# Patient Record
Sex: MALE | Race: WHITE | NOT HISPANIC OR LATINO | Employment: FULL TIME | URBAN - METROPOLITAN AREA
[De-identification: names, ages, dates, MRNs, and addresses within clinical notes are randomized per-mention and may not be internally consistent; named-entity substitution may affect disease eponyms.]

---

## 2022-06-28 ENCOUNTER — PREP FOR PROCEDURE (OUTPATIENT)
Dept: OTHER | Facility: CLINIC | Age: 42
End: 2022-06-28

## 2022-06-28 ENCOUNTER — ANESTHESIA (OUTPATIENT)
Dept: SURGERY | Facility: CLINIC | Age: 42
End: 2022-06-28
Payer: COMMERCIAL

## 2022-06-28 ENCOUNTER — APPOINTMENT (OUTPATIENT)
Dept: MRI IMAGING | Facility: CLINIC | Age: 42
End: 2022-06-28
Attending: EMERGENCY MEDICINE
Payer: COMMERCIAL

## 2022-06-28 ENCOUNTER — HOSPITAL ENCOUNTER (OUTPATIENT)
Facility: CLINIC | Age: 42
Setting detail: OBSERVATION
Discharge: HOME | End: 2022-06-29
Attending: EMERGENCY MEDICINE | Admitting: FAMILY MEDICINE
Payer: COMMERCIAL

## 2022-06-28 ENCOUNTER — ANESTHESIA EVENT (OUTPATIENT)
Dept: SURGERY | Facility: CLINIC | Age: 42
End: 2022-06-28
Payer: COMMERCIAL

## 2022-06-28 DIAGNOSIS — L02.611 ABSCESS OF GREAT TOE OF RIGHT FOOT: Primary | ICD-10-CM

## 2022-06-28 DIAGNOSIS — L08.9 TOE INFECTION: ICD-10-CM

## 2022-06-28 DIAGNOSIS — M00.9 SEPTIC ARTHRITIS, DUE TO UNSPECIFIED ORGANISM, SEPTIC ARTHRITIS OF UNSPECIFIED LOCATION (H): ICD-10-CM

## 2022-06-28 DIAGNOSIS — M00.9: Primary | ICD-10-CM

## 2022-06-28 PROBLEM — I10 ESSENTIAL HYPERTENSION: Status: ACTIVE | Noted: 2022-04-05

## 2022-06-28 PROBLEM — M54.2 NECK PAIN: Status: ACTIVE | Noted: 2022-01-20

## 2022-06-28 PROBLEM — F41.9 ANXIETY: Status: ACTIVE | Noted: 2022-01-20

## 2022-06-28 LAB
ABO/RH(D): NORMAL
ANION GAP SERPL CALCULATED.3IONS-SCNC: 10 MMOL/L (ref 5–18)
ANTIBODY SCREEN: NEGATIVE
APTT PPP: 28 SECONDS (ref 22–38)
BASOPHILS # BLD AUTO: 0.1 10E3/UL (ref 0–0.2)
BASOPHILS NFR BLD AUTO: 1 %
BUN SERPL-MCNC: 11 MG/DL (ref 8–22)
C REACTIVE PROTEIN LHE: 2.9 MG/DL (ref 0–?)
CALCIUM SERPL-MCNC: 9.5 MG/DL (ref 8.5–10.5)
CHLORIDE BLD-SCNC: 102 MMOL/L (ref 98–107)
CO2 SERPL-SCNC: 26 MMOL/L (ref 22–31)
CREAT SERPL-MCNC: 0.78 MG/DL (ref 0.7–1.3)
EOSINOPHIL # BLD AUTO: 0 10E3/UL (ref 0–0.7)
EOSINOPHIL NFR BLD AUTO: 0 %
ERYTHROCYTE [DISTWIDTH] IN BLOOD BY AUTOMATED COUNT: 12.1 % (ref 10–15)
ERYTHROCYTE [SEDIMENTATION RATE] IN BLOOD BY WESTERGREN METHOD: 8 MM/HR (ref 0–15)
GFR SERPL CREATININE-BSD FRML MDRD: >90 ML/MIN/1.73M2
GLUCOSE BLD-MCNC: 107 MG/DL (ref 70–125)
GRAM STAIN RESULT: NORMAL
GRAM STAIN RESULT: NORMAL
HCT VFR BLD AUTO: 44.7 % (ref 40–53)
HGB BLD-MCNC: 15.5 G/DL (ref 13.3–17.7)
IMM GRANULOCYTES # BLD: 0.1 10E3/UL
IMM GRANULOCYTES NFR BLD: 1 %
INR PPP: 0.99 (ref 0.85–1.15)
LYMPHOCYTES # BLD AUTO: 1.3 10E3/UL (ref 0.8–5.3)
LYMPHOCYTES NFR BLD AUTO: 15 %
MCH RBC QN AUTO: 31.8 PG (ref 26.5–33)
MCHC RBC AUTO-ENTMCNC: 34.7 G/DL (ref 31.5–36.5)
MCV RBC AUTO: 92 FL (ref 78–100)
MONOCYTES # BLD AUTO: 0.7 10E3/UL (ref 0–1.3)
MONOCYTES NFR BLD AUTO: 7 %
NEUTROPHILS # BLD AUTO: 6.7 10E3/UL (ref 1.6–8.3)
NEUTROPHILS NFR BLD AUTO: 76 %
NRBC # BLD AUTO: 0 10E3/UL
NRBC BLD AUTO-RTO: 0 /100
PLATELET # BLD AUTO: 231 10E3/UL (ref 150–450)
POTASSIUM BLD-SCNC: 4.1 MMOL/L (ref 3.5–5)
RBC # BLD AUTO: 4.87 10E6/UL (ref 4.4–5.9)
SARS-COV-2 RNA RESP QL NAA+PROBE: NEGATIVE
SODIUM SERPL-SCNC: 138 MMOL/L (ref 136–145)
SPECIMEN EXPIRATION DATE: NORMAL
WBC # BLD AUTO: 8.8 10E3/UL (ref 4–11)

## 2022-06-28 PROCEDURE — 258N000003 HC RX IP 258 OP 636: Performed by: ANESTHESIOLOGY

## 2022-06-28 PROCEDURE — 258N000001 HC RX 258: Performed by: STUDENT IN AN ORGANIZED HEALTH CARE EDUCATION/TRAINING PROGRAM

## 2022-06-28 PROCEDURE — U0003 INFECTIOUS AGENT DETECTION BY NUCLEIC ACID (DNA OR RNA); SEVERE ACUTE RESPIRATORY SYNDROME CORONAVIRUS 2 (SARS-COV-2) (CORONAVIRUS DISEASE [COVID-19]), AMPLIFIED PROBE TECHNIQUE, MAKING USE OF HIGH THROUGHPUT TECHNOLOGIES AS DESCRIBED BY CMS-2020-01-R: HCPCS | Performed by: EMERGENCY MEDICINE

## 2022-06-28 PROCEDURE — 250N000011 HC RX IP 250 OP 636

## 2022-06-28 PROCEDURE — 80048 BASIC METABOLIC PNL TOTAL CA: CPT | Performed by: EMERGENCY MEDICINE

## 2022-06-28 PROCEDURE — 250N000011 HC RX IP 250 OP 636: Performed by: PHYSICIAN ASSISTANT

## 2022-06-28 PROCEDURE — G0378 HOSPITAL OBSERVATION PER HR: HCPCS

## 2022-06-28 PROCEDURE — 370N000017 HC ANESTHESIA TECHNICAL FEE, PER MIN: Performed by: STUDENT IN AN ORGANIZED HEALTH CARE EDUCATION/TRAINING PROGRAM

## 2022-06-28 PROCEDURE — 258N000003 HC RX IP 258 OP 636

## 2022-06-28 PROCEDURE — 99220 PR INITIAL OBSERVATION CARE,LEVEL III: CPT | Mod: GC

## 2022-06-28 PROCEDURE — 250N000013 HC RX MED GY IP 250 OP 250 PS 637: Performed by: PHYSICIAN ASSISTANT

## 2022-06-28 PROCEDURE — 250N000011 HC RX IP 250 OP 636: Performed by: NURSE ANESTHETIST, CERTIFIED REGISTERED

## 2022-06-28 PROCEDURE — 86901 BLOOD TYPING SEROLOGIC RH(D): CPT | Performed by: EMERGENCY MEDICINE

## 2022-06-28 PROCEDURE — 272N000001 HC OR GENERAL SUPPLY STERILE: Performed by: STUDENT IN AN ORGANIZED HEALTH CARE EDUCATION/TRAINING PROGRAM

## 2022-06-28 PROCEDURE — A9585 GADOBUTROL INJECTION: HCPCS | Performed by: EMERGENCY MEDICINE

## 2022-06-28 PROCEDURE — 93005 ELECTROCARDIOGRAM TRACING: CPT

## 2022-06-28 PROCEDURE — 86140 C-REACTIVE PROTEIN: CPT | Performed by: EMERGENCY MEDICINE

## 2022-06-28 PROCEDURE — 36415 COLL VENOUS BLD VENIPUNCTURE: CPT | Performed by: EMERGENCY MEDICINE

## 2022-06-28 PROCEDURE — 85004 AUTOMATED DIFF WBC COUNT: CPT | Performed by: EMERGENCY MEDICINE

## 2022-06-28 PROCEDURE — 87641 MR-STAPH DNA AMP PROBE: CPT

## 2022-06-28 PROCEDURE — 250N000009 HC RX 250: Performed by: STUDENT IN AN ORGANIZED HEALTH CARE EDUCATION/TRAINING PROGRAM

## 2022-06-28 PROCEDURE — 87205 SMEAR GRAM STAIN: CPT | Performed by: STUDENT IN AN ORGANIZED HEALTH CARE EDUCATION/TRAINING PROGRAM

## 2022-06-28 PROCEDURE — 85652 RBC SED RATE AUTOMATED: CPT | Performed by: EMERGENCY MEDICINE

## 2022-06-28 PROCEDURE — 999N000141 HC STATISTIC PRE-PROCEDURE NURSING ASSESSMENT: Performed by: STUDENT IN AN ORGANIZED HEALTH CARE EDUCATION/TRAINING PROGRAM

## 2022-06-28 PROCEDURE — 96375 TX/PRO/DX INJ NEW DRUG ADDON: CPT

## 2022-06-28 PROCEDURE — 99285 EMERGENCY DEPT VISIT HI MDM: CPT | Mod: 25

## 2022-06-28 PROCEDURE — 87070 CULTURE OTHR SPECIMN AEROBIC: CPT | Performed by: STUDENT IN AN ORGANIZED HEALTH CARE EDUCATION/TRAINING PROGRAM

## 2022-06-28 PROCEDURE — 96374 THER/PROPH/DIAG INJ IV PUSH: CPT

## 2022-06-28 PROCEDURE — 255N000002 HC RX 255 OP 636: Performed by: EMERGENCY MEDICINE

## 2022-06-28 PROCEDURE — 87077 CULTURE AEROBIC IDENTIFY: CPT | Performed by: STUDENT IN AN ORGANIZED HEALTH CARE EDUCATION/TRAINING PROGRAM

## 2022-06-28 PROCEDURE — 73720 MRI LWR EXTREMITY W/O&W/DYE: CPT | Mod: RT

## 2022-06-28 PROCEDURE — 85610 PROTHROMBIN TIME: CPT | Performed by: EMERGENCY MEDICINE

## 2022-06-28 PROCEDURE — 85730 THROMBOPLASTIN TIME PARTIAL: CPT | Performed by: EMERGENCY MEDICINE

## 2022-06-28 PROCEDURE — 86850 RBC ANTIBODY SCREEN: CPT | Performed by: EMERGENCY MEDICINE

## 2022-06-28 PROCEDURE — C9803 HOPD COVID-19 SPEC COLLECT: HCPCS

## 2022-06-28 PROCEDURE — 360N000075 HC SURGERY LEVEL 2, PER MIN: Performed by: STUDENT IN AN ORGANIZED HEALTH CARE EDUCATION/TRAINING PROGRAM

## 2022-06-28 RX ORDER — FENTANYL CITRATE 50 UG/ML
INJECTION, SOLUTION INTRAMUSCULAR; INTRAVENOUS PRN
Status: DISCONTINUED | OUTPATIENT
Start: 2022-06-28 | End: 2022-06-28

## 2022-06-28 RX ORDER — ACETAMINOPHEN 325 MG/1
975 TABLET ORAL EVERY 8 HOURS
Status: DISCONTINUED | OUTPATIENT
Start: 2022-06-28 | End: 2022-06-29 | Stop reason: HOSPADM

## 2022-06-28 RX ORDER — ACETAMINOPHEN 325 MG/1
650 TABLET ORAL EVERY 6 HOURS PRN
Status: DISCONTINUED | OUTPATIENT
Start: 2022-06-28 | End: 2022-06-28

## 2022-06-28 RX ORDER — NAPROXEN 250 MG/1
500 TABLET ORAL EVERY 12 HOURS PRN
Status: DISCONTINUED | OUTPATIENT
Start: 2022-06-28 | End: 2022-06-28

## 2022-06-28 RX ORDER — MAGNESIUM HYDROXIDE 1200 MG/15ML
LIQUID ORAL PRN
Status: DISCONTINUED | OUTPATIENT
Start: 2022-06-28 | End: 2022-06-28 | Stop reason: HOSPADM

## 2022-06-28 RX ORDER — BISACODYL 10 MG
10 SUPPOSITORY, RECTAL RECTAL DAILY PRN
Status: DISCONTINUED | OUTPATIENT
Start: 2022-06-28 | End: 2022-06-29 | Stop reason: HOSPADM

## 2022-06-28 RX ORDER — ENOXAPARIN SODIUM 100 MG/ML
40 INJECTION SUBCUTANEOUS EVERY 24 HOURS
Status: DISCONTINUED | OUTPATIENT
Start: 2022-06-28 | End: 2022-06-28

## 2022-06-28 RX ORDER — GADOBUTROL 604.72 MG/ML
8 INJECTION INTRAVENOUS ONCE
Status: COMPLETED | OUTPATIENT
Start: 2022-06-28 | End: 2022-06-28

## 2022-06-28 RX ORDER — ONDANSETRON 2 MG/ML
INJECTION INTRAMUSCULAR; INTRAVENOUS PRN
Status: DISCONTINUED | OUTPATIENT
Start: 2022-06-28 | End: 2022-06-28

## 2022-06-28 RX ORDER — LIDOCAINE 40 MG/G
CREAM TOPICAL
Status: DISCONTINUED | OUTPATIENT
Start: 2022-06-28 | End: 2022-06-29 | Stop reason: HOSPADM

## 2022-06-28 RX ORDER — SODIUM CHLORIDE 9 MG/ML
INJECTION, SOLUTION INTRAVENOUS CONTINUOUS
Status: DISCONTINUED | OUTPATIENT
Start: 2022-06-28 | End: 2022-06-29 | Stop reason: HOSPADM

## 2022-06-28 RX ORDER — NALOXONE HYDROCHLORIDE 0.4 MG/ML
0.2 INJECTION, SOLUTION INTRAMUSCULAR; INTRAVENOUS; SUBCUTANEOUS
Status: DISCONTINUED | OUTPATIENT
Start: 2022-06-28 | End: 2022-06-29 | Stop reason: HOSPADM

## 2022-06-28 RX ORDER — CEFTRIAXONE 1 G/1
1 INJECTION, POWDER, FOR SOLUTION INTRAMUSCULAR; INTRAVENOUS ONCE
Status: COMPLETED | OUTPATIENT
Start: 2022-06-28 | End: 2022-06-28

## 2022-06-28 RX ORDER — HYDROMORPHONE HYDROCHLORIDE 1 MG/ML
0.5 INJECTION, SOLUTION INTRAMUSCULAR; INTRAVENOUS; SUBCUTANEOUS EVERY 4 HOURS PRN
Status: DISCONTINUED | OUTPATIENT
Start: 2022-06-28 | End: 2022-06-28

## 2022-06-28 RX ORDER — FENTANYL CITRATE 50 UG/ML
25 INJECTION, SOLUTION INTRAMUSCULAR; INTRAVENOUS EVERY 5 MIN PRN
Status: CANCELLED | OUTPATIENT
Start: 2022-06-28

## 2022-06-28 RX ORDER — LISINOPRIL 20 MG/1
20 TABLET ORAL EVERY EVENING
COMMUNITY
Start: 2022-05-05 | End: 2023-05-05

## 2022-06-28 RX ORDER — CEFAZOLIN SODIUM/WATER 2 G/20 ML
2 SYRINGE (ML) INTRAVENOUS
Status: DISCONTINUED | OUTPATIENT
Start: 2022-06-28 | End: 2022-06-28 | Stop reason: ALTCHOICE

## 2022-06-28 RX ORDER — PROPOFOL 10 MG/ML
INJECTION, EMULSION INTRAVENOUS CONTINUOUS PRN
Status: DISCONTINUED | OUTPATIENT
Start: 2022-06-28 | End: 2022-06-28

## 2022-06-28 RX ORDER — ASPIRIN 81 MG/1
81 TABLET ORAL 2 TIMES DAILY
Status: DISCONTINUED | OUTPATIENT
Start: 2022-06-28 | End: 2022-06-29 | Stop reason: HOSPADM

## 2022-06-28 RX ORDER — LIDOCAINE 40 MG/G
CREAM TOPICAL
Status: DISCONTINUED | OUTPATIENT
Start: 2022-06-28 | End: 2022-06-28 | Stop reason: HOSPADM

## 2022-06-28 RX ORDER — ENOXAPARIN SODIUM 100 MG/ML
40 INJECTION SUBCUTANEOUS EVERY 24 HOURS
Status: DISCONTINUED | OUTPATIENT
Start: 2022-06-29 | End: 2022-06-29 | Stop reason: HOSPADM

## 2022-06-28 RX ORDER — CEFTRIAXONE 1 G/1
1 INJECTION, POWDER, FOR SOLUTION INTRAMUSCULAR; INTRAVENOUS EVERY 24 HOURS
Status: DISCONTINUED | OUTPATIENT
Start: 2022-06-29 | End: 2022-06-29 | Stop reason: HOSPADM

## 2022-06-28 RX ORDER — HYDROMORPHONE HCL IN WATER/PF 6 MG/30 ML
0.4 PATIENT CONTROLLED ANALGESIA SYRINGE INTRAVENOUS
Status: DISCONTINUED | OUTPATIENT
Start: 2022-06-28 | End: 2022-06-29 | Stop reason: HOSPADM

## 2022-06-28 RX ORDER — CEFAZOLIN SODIUM/WATER 2 G/20 ML
2 SYRINGE (ML) INTRAVENOUS SEE ADMIN INSTRUCTIONS
Status: DISCONTINUED | OUTPATIENT
Start: 2022-06-28 | End: 2022-06-28

## 2022-06-28 RX ORDER — CEFTRIAXONE SODIUM 1 G
1 VIAL (EA) INJECTION EVERY 24 HOURS
Status: DISCONTINUED | OUTPATIENT
Start: 2022-06-29 | End: 2022-06-28 | Stop reason: CLARIF

## 2022-06-28 RX ORDER — ACETAMINOPHEN 325 MG/1
650 TABLET ORAL EVERY 4 HOURS PRN
Status: DISCONTINUED | OUTPATIENT
Start: 2022-07-01 | End: 2022-06-29 | Stop reason: HOSPADM

## 2022-06-28 RX ORDER — DEXAMETHASONE SODIUM PHOSPHATE 4 MG/ML
INJECTION, SOLUTION INTRA-ARTICULAR; INTRALESIONAL; INTRAMUSCULAR; INTRAVENOUS; SOFT TISSUE PRN
Status: DISCONTINUED | OUTPATIENT
Start: 2022-06-28 | End: 2022-06-28

## 2022-06-28 RX ORDER — PROPOFOL 10 MG/ML
INJECTION, EMULSION INTRAVENOUS PRN
Status: DISCONTINUED | OUTPATIENT
Start: 2022-06-28 | End: 2022-06-28

## 2022-06-28 RX ORDER — SODIUM CHLORIDE, SODIUM LACTATE, POTASSIUM CHLORIDE, CALCIUM CHLORIDE 600; 310; 30; 20 MG/100ML; MG/100ML; MG/100ML; MG/100ML
INJECTION, SOLUTION INTRAVENOUS CONTINUOUS
Status: DISCONTINUED | OUTPATIENT
Start: 2022-06-28 | End: 2022-06-28

## 2022-06-28 RX ORDER — POLYETHYLENE GLYCOL 3350 17 G/17G
17 POWDER, FOR SOLUTION ORAL DAILY
Status: DISCONTINUED | OUTPATIENT
Start: 2022-06-29 | End: 2022-06-29 | Stop reason: HOSPADM

## 2022-06-28 RX ORDER — ONDANSETRON 4 MG/1
4 TABLET, ORALLY DISINTEGRATING ORAL EVERY 30 MIN PRN
Status: CANCELLED | OUTPATIENT
Start: 2022-06-28

## 2022-06-28 RX ORDER — PROCHLORPERAZINE MALEATE 10 MG
10 TABLET ORAL EVERY 6 HOURS PRN
Status: DISCONTINUED | OUTPATIENT
Start: 2022-06-28 | End: 2022-06-29 | Stop reason: HOSPADM

## 2022-06-28 RX ORDER — TRAMADOL HYDROCHLORIDE 50 MG/1
50 TABLET ORAL EVERY 6 HOURS PRN
Status: DISCONTINUED | OUTPATIENT
Start: 2022-06-28 | End: 2022-06-29 | Stop reason: HOSPADM

## 2022-06-28 RX ORDER — AMOXICILLIN 250 MG
1 CAPSULE ORAL 2 TIMES DAILY
Status: DISCONTINUED | OUTPATIENT
Start: 2022-06-28 | End: 2022-06-29 | Stop reason: HOSPADM

## 2022-06-28 RX ORDER — NALOXONE HYDROCHLORIDE 0.4 MG/ML
0.4 INJECTION, SOLUTION INTRAMUSCULAR; INTRAVENOUS; SUBCUTANEOUS
Status: DISCONTINUED | OUTPATIENT
Start: 2022-06-28 | End: 2022-06-29 | Stop reason: HOSPADM

## 2022-06-28 RX ORDER — OXYCODONE HYDROCHLORIDE 5 MG/1
5 TABLET ORAL EVERY 4 HOURS PRN
Status: CANCELLED | OUTPATIENT
Start: 2022-06-28

## 2022-06-28 RX ORDER — ENOXAPARIN SODIUM 100 MG/ML
40 INJECTION SUBCUTANEOUS EVERY 24 HOURS
Status: DISCONTINUED | OUTPATIENT
Start: 2022-06-29 | End: 2022-06-28

## 2022-06-28 RX ORDER — IBUPROFEN 600 MG/1
600 TABLET, FILM COATED ORAL EVERY 6 HOURS PRN
Status: DISCONTINUED | OUTPATIENT
Start: 2022-06-28 | End: 2022-06-29 | Stop reason: HOSPADM

## 2022-06-28 RX ORDER — ONDANSETRON 4 MG/1
4 TABLET, ORALLY DISINTEGRATING ORAL EVERY 6 HOURS PRN
Status: DISCONTINUED | OUTPATIENT
Start: 2022-06-28 | End: 2022-06-29 | Stop reason: HOSPADM

## 2022-06-28 RX ORDER — SODIUM CHLORIDE, SODIUM LACTATE, POTASSIUM CHLORIDE, CALCIUM CHLORIDE 600; 310; 30; 20 MG/100ML; MG/100ML; MG/100ML; MG/100ML
INJECTION, SOLUTION INTRAVENOUS CONTINUOUS
Status: CANCELLED | OUTPATIENT
Start: 2022-06-28

## 2022-06-28 RX ORDER — CEFAZOLIN SODIUM 2 G/100ML
2 INJECTION, SOLUTION INTRAVENOUS EVERY 8 HOURS
Status: COMPLETED | OUTPATIENT
Start: 2022-06-28 | End: 2022-06-29

## 2022-06-28 RX ORDER — HYDROMORPHONE HCL IN WATER/PF 6 MG/30 ML
0.2 PATIENT CONTROLLED ANALGESIA SYRINGE INTRAVENOUS
Status: DISCONTINUED | OUTPATIENT
Start: 2022-06-28 | End: 2022-06-29 | Stop reason: HOSPADM

## 2022-06-28 RX ORDER — ONDANSETRON 2 MG/ML
4 INJECTION INTRAMUSCULAR; INTRAVENOUS EVERY 6 HOURS PRN
Status: DISCONTINUED | OUTPATIENT
Start: 2022-06-28 | End: 2022-06-29 | Stop reason: HOSPADM

## 2022-06-28 RX ORDER — LIDOCAINE 40 MG/G
CREAM TOPICAL
Status: DISCONTINUED | OUTPATIENT
Start: 2022-06-28 | End: 2022-06-28

## 2022-06-28 RX ORDER — ACETAMINOPHEN 650 MG/1
650 SUPPOSITORY RECTAL EVERY 6 HOURS PRN
Status: DISCONTINUED | OUTPATIENT
Start: 2022-06-28 | End: 2022-06-28

## 2022-06-28 RX ORDER — CEPHALEXIN 500 MG/1
500 CAPSULE ORAL 4 TIMES DAILY
Status: ON HOLD | COMMUNITY
Start: 2022-06-26 | End: 2022-06-29

## 2022-06-28 RX ORDER — SODIUM CHLORIDE, SODIUM LACTATE, POTASSIUM CHLORIDE, CALCIUM CHLORIDE 600; 310; 30; 20 MG/100ML; MG/100ML; MG/100ML; MG/100ML
INJECTION, SOLUTION INTRAVENOUS CONTINUOUS
Status: DISCONTINUED | OUTPATIENT
Start: 2022-06-28 | End: 2022-06-28 | Stop reason: HOSPADM

## 2022-06-28 RX ORDER — LIDOCAINE HYDROCHLORIDE 10 MG/ML
INJECTION, SOLUTION EPIDURAL; INFILTRATION; INTRACAUDAL; PERINEURAL PRN
Status: DISCONTINUED | OUTPATIENT
Start: 2022-06-28 | End: 2022-06-28 | Stop reason: HOSPADM

## 2022-06-28 RX ORDER — ONDANSETRON 2 MG/ML
4 INJECTION INTRAMUSCULAR; INTRAVENOUS EVERY 30 MIN PRN
Status: CANCELLED | OUTPATIENT
Start: 2022-06-28

## 2022-06-28 RX ORDER — HYDROMORPHONE HCL IN WATER/PF 6 MG/30 ML
0.2 PATIENT CONTROLLED ANALGESIA SYRINGE INTRAVENOUS EVERY 5 MIN PRN
Status: CANCELLED | OUTPATIENT
Start: 2022-06-28

## 2022-06-28 RX ADMIN — CEFAZOLIN SODIUM 2 G: 2 INJECTION, SOLUTION INTRAVENOUS at 21:36

## 2022-06-28 RX ADMIN — ONDANSETRON 4 MG: 2 INJECTION INTRAMUSCULAR; INTRAVENOUS at 17:44

## 2022-06-28 RX ADMIN — FENTANYL CITRATE 50 MCG: 50 INJECTION, SOLUTION INTRAMUSCULAR; INTRAVENOUS at 17:35

## 2022-06-28 RX ADMIN — ACETAMINOPHEN 975 MG: 325 TABLET ORAL at 21:36

## 2022-06-28 RX ADMIN — HYDROMORPHONE HYDROCHLORIDE 0.4 MG: 0.2 INJECTION, SOLUTION INTRAMUSCULAR; INTRAVENOUS; SUBCUTANEOUS at 21:35

## 2022-06-28 RX ADMIN — MIDAZOLAM 2 MG: 1 INJECTION INTRAMUSCULAR; INTRAVENOUS at 17:30

## 2022-06-28 RX ADMIN — SENNOSIDES AND DOCUSATE SODIUM 1 TABLET: 50; 8.6 TABLET ORAL at 21:36

## 2022-06-28 RX ADMIN — GADOBUTROL 8 ML: 604.72 INJECTION INTRAVENOUS at 14:33

## 2022-06-28 RX ADMIN — PROPOFOL 50 MG: 10 INJECTION, EMULSION INTRAVENOUS at 17:36

## 2022-06-28 RX ADMIN — DEXAMETHASONE SODIUM PHOSPHATE 4 MG: 4 INJECTION, SOLUTION INTRA-ARTICULAR; INTRALESIONAL; INTRAMUSCULAR; INTRAVENOUS; SOFT TISSUE at 17:44

## 2022-06-28 RX ADMIN — SODIUM CHLORIDE: 9 INJECTION, SOLUTION INTRAVENOUS at 18:40

## 2022-06-28 RX ADMIN — PROPOFOL 50 MG: 10 INJECTION, EMULSION INTRAVENOUS at 17:37

## 2022-06-28 RX ADMIN — SODIUM CHLORIDE, POTASSIUM CHLORIDE, SODIUM LACTATE AND CALCIUM CHLORIDE: 600; 310; 30; 20 INJECTION, SOLUTION INTRAVENOUS at 17:25

## 2022-06-28 RX ADMIN — PROPOFOL 150 MCG/KG/MIN: 10 INJECTION, EMULSION INTRAVENOUS at 17:37

## 2022-06-28 RX ADMIN — FENTANYL CITRATE 50 MCG: 50 INJECTION, SOLUTION INTRAMUSCULAR; INTRAVENOUS at 17:34

## 2022-06-28 RX ADMIN — CEFTRIAXONE 1 G: 1 INJECTION, POWDER, FOR SOLUTION INTRAMUSCULAR; INTRAVENOUS at 16:37

## 2022-06-28 RX ADMIN — VANCOMYCIN HYDROCHLORIDE 1750 MG: 5 INJECTION, POWDER, LYOPHILIZED, FOR SOLUTION INTRAVENOUS at 18:40

## 2022-06-28 ASSESSMENT — ENCOUNTER SYMPTOMS
DIFFICULTY URINATING: 0
COLOR CHANGE: 1
SHORTNESS OF BREATH: 0
FEVER: 0
ABDOMINAL PAIN: 0
CONFUSION: 0
NUMBNESS: 1
JOINT SWELLING: 1
NECK STIFFNESS: 0
ARTHRALGIAS: 1
HEADACHES: 0
EYE REDNESS: 0

## 2022-06-28 NOTE — ED NOTES
Expected Patient Referral to ED  12:04 PM    Referring Clinic/Provider:  Urgent Care in [place]    Reason for referral/Clinical facts:  Stubbed toe recently, on cephalexin.  Toe more red, swollen and tight.  Dr. Abbott ([facility]) though he would benefit from a washout.  Wants an MRI    Recommendations provided:  Send to ED for further evaluation    Caller was informed that this institution does possess the capabilities and/or resources to provide for patient and should be transferred to our facility.    Discussed that if direct admit is sought and any hurdles are encountered, this ED would be happy to see the patient and evaluate.    Informed caller that recommendations provided are recommendations based only on the facts provided and that they responsible to accept or reject the advice, or to seek a formal in person consultation as needed and that this ED will see/treat patient should they arrive.      Samantha Gay DO  [facility] EMERGENCY ROOM  [address] [address]  [phone]       Samantha Gay DO  06/28/22 1206

## 2022-06-28 NOTE — ANESTHESIA POSTPROCEDURE EVALUATION
Patient: Harshad An Emma    Procedure: Procedure(s):  IRRIGATION AND DEBRIDEMENT, RIGHT GREAT TOE  REMOVAL, FOREIGN BODY, RIGHT GREAT TOE       Anesthesia Type:  MAC    Note:  Disposition: Inpatient   Postop Pain Control: Uneventful            Sign Out: Well controlled pain   PONV: No   Neuro/Psych: Uneventful            Sign Out: Acceptable/Baseline neuro status   Airway/Respiratory: Uneventful            Sign Out: Acceptable/Baseline resp. status   CV/Hemodynamics: Uneventful            Sign Out: Acceptable CV status; No obvious hypovolemia; No obvious fluid overload   Other NRE: NONE   DID A NON-ROUTINE EVENT OCCUR? No           Last vitals:  Vitals:    06/28/22 1604 06/28/22 1622 06/28/22 1837   BP: (!) 141/102 (!) 145/96 134/70   Pulse: 83 73 72   Resp: 20 18 20   Temp: 36.9  C (98.5  F)  36.4  C (97.6  F)   SpO2:   95%       Electronically Signed By: AMANDA AVILES MD  June 28, 2022  6:51 PM

## 2022-06-28 NOTE — ED PROVIDER NOTES
EMERGENCY DEPARTMENT ENCOUNTER     NAME: Harshad Carter   AGE: 41 year old male   YOB: 1980   MRN: [id number]   EVALUATION DATE & TIME: 6/28/2022 12:52 PM   PCP: No primary care provider on file.     Chief Complaint   Patient presents with     Infection     Toe Injury   :    FINAL IMPRESSION       1. Toe infection    2. Septic arthritis, due to unspecified organism, septic arthritis of unspecified location (H)           ED COURSE & MEDICAL DECISION MAKING      Pertinent Labs & Imaging studies reviewed. (See chart for details)   41 year old male  presents to the Emergency Department for evaluation of a right great toe infection that is getting worse despite Keflex.  Patient had some trauma to his toe, ended up with some cellulitis and was placed on Keflex, and had continued worsening.  He was seen at an outpatient urgent care where orthopedics was consulted and he was transferred here. Initial Vitals Reviewed. Initial exam notable for generally well-appearing male who is afebrile and nontoxic but he does have erythema and swelling of the right great toe.  On the dorsal surface there is a bullae that appears filled with clear fluid.  Per orthopedics, they were suspicious that he would end up needing a washout procedure and sent him to the ED with instructions to order an MRI.  Labs do not show leukocytosis and his inflammatory markers are reassuring with only minimal elevation, but an MRI was done which shows what appears to be septic arthritis of the IP joint.  Case was discussed with the orthopedic team and at this time they are planning for operative washout which will happen at about 930 tonight based on OR time.  He is being kept n.p.o. and at this time holding on antibiotics until surgery and cultures can be obtained per orthopedics.  He is comfortable with this plan and I also discussed the case with admitting resident team as I anticipate a need for admission after surgery.        12:58 PM  I met with the patient to gather history and to perform my initial exam. We discussed plans for the ED course, including diagnostic testing and treatment. PPE: N95 mask, surgical mask, gloves  2:36 PM I spoke to [facility].  3:08 PM I spoke to Dr. Perry PA.   3:14 PM I updated the patient on the plan.   At the conclusion of the encounter I discussed the results of all of the tests and the disposition. The questions were answered. The patient or family acknowledged understanding and was agreeable with the care plan.         MEDICATIONS GIVEN IN THE EMERGENCY:   Medications   gadobutrol (GADAVIST) injection 8 mL (8 mLs Intravenous Given 6/28/22 8567)      NEW PRESCRIPTIONS STARTED AT TODAY'S ER VISIT   New Prescriptions    No medications on file     ================================================================   HISTORY OF PRESENT ILLNESS       Patient information was obtained from: patient    Use of Intrepreter: N/A   Harshad Carter is a 41 year old male with history of hypertension who presents for evaluation of right great toe pain, redness, and swelling.    Patient reports stubbing his right great toe on Thursday (6/23/22). He was seen on Sunday (6/26/22) for worsening pain and redness and was started on Cephalexin. The pain, redness, and swelling are continuing to worsen despite antibiotics so the patient saw an Urgent Care in [place] today and was told to come to the ED for further evaluation. Per triage note, the patient endorses some numbness and tingling in the toe. Patient states he presently feels okay aside from the toe discomfort. Denies any fever. No other complaints or concerns expressed at this time.    Per chart review, patient uses smokeless chewing tobacco (rarely).     ================================================================    REVIEW OF SYSTEMS       Review of Systems   Constitutional: Negative for fever.   HENT: Negative for congestion.    Eyes: Negative for  "redness.   Respiratory: Negative for shortness of breath.    Cardiovascular: Negative for chest pain.   Gastrointestinal: Negative for abdominal pain.   Genitourinary: Negative for difficulty urinating.   Musculoskeletal: Positive for arthralgias (Right great toe) and joint swelling (Right great toe). Negative for neck stiffness.   Skin: Positive for color change (Redness of right great toe).   Neurological: Positive for numbness (Right great toe). Negative for headaches.   Psychiatric/Behavioral: Negative for confusion.   All other systems reviewed and are negative.      PAST HISTORY     PAST MEDICAL HISTORY:   History reviewed. No pertinent past medical history.   PAST SURGICAL HISTORY:   History reviewed. No pertinent surgical history.   CURRENT MEDICATIONS:   No current outpatient medications on file.    ALLERGIES:   No Known Allergies   FAMILY HISTORY:   History reviewed. No pertinent family history.   SOCIAL HISTORY:   Social History     Socioeconomic History     Marital status: Single        VITALS  Patient Vitals for the past 24 hrs:   BP Temp Temp src Pulse Resp SpO2 Height Weight   06/28/22 1423 -- -- -- -- -- -- 1.854 m (6' 1\") --   06/28/22 1249 (!) 158/109 98.9  F (37.2  C) Oral 92 18 100 % -- 81.6 kg (180 lb)        ================================================================    PHYSICAL EXAM     VITAL SIGNS: BP (!) 158/109   Pulse 92   Temp 98.9  F (37.2  C) (Oral)   Resp 18   Ht 1.854 m (6' 1\")   Wt 81.6 kg (180 lb)   SpO2 100%   BMI 23.75 kg/m     Constitutional:  Awake, no acute distress   HENT:  Atraumatic, oropharynx without exudate or erythema, membranes moist  Lymph:  No adenopathy  Eyes: EOM intact, PERRL, no injection  Neck: Supple  Respiratory:  Clear to auscultation bilaterally, no wheezes or crackles   Cardiovascular:  Regular rate and rhythm, single S1 and S2   GI:  Soft, nontender, nondistended, no rebound or guarding   Musculoskeletal:  Moves all extremities, no lower " extremity edema, no deformities    Skin:  Warm, dry, erythema and swelling of the right great toe.  On the dorsal surface there is a bullae that appears filled with clear fluid.  Neurologic:  Alert and oriented x3, no focal deficits noted       ================================================================  LAB       All pertinent labs reviewed and interpreted.   Labs Ordered and Resulted from Time of ED Arrival to Time of ED Departure   CRP INFLAMMATION - Abnormal       Result Value    CRP 2.9 (*)    INR - Normal    INR 0.99     PARTIAL THROMBOPLASTIN TIME - Normal    aPTT 28     BASIC METABOLIC PANEL - Normal    Sodium 138      Potassium 4.1      Chloride 102      Carbon Dioxide (CO2) 26      Anion Gap 10      Urea Nitrogen 11      Creatinine 0.78      Calcium 9.5      Glucose 107      GFR Estimate >90     ERYTHROCYTE SEDIMENTATION RATE AUTO - Normal    Erythrocyte Sedimentation Rate 8     CBC WITH PLATELETS AND DIFFERENTIAL    WBC Count 8.8      RBC Count 4.87      Hemoglobin 15.5      Hematocrit 44.7      MCV 92      MCH 31.8      MCHC 34.7      RDW 12.1      Platelet Count 231      % Neutrophils 76      % Lymphocytes 15      % Monocytes 7      % Eosinophils 0      % Basophils 1      % Immature Granulocytes 1      NRBCs per 100 WBC 0      Absolute Neutrophils 6.7      Absolute Lymphocytes 1.3      Absolute Monocytes 0.7      Absolute Eosinophils 0.0      Absolute Basophils 0.1      Absolute Immature Granulocytes 0.1      Absolute NRBCs 0.0     COVID-19 VIRUS (CORONAVIRUS) BY PCR   TYPE AND SCREEN, ADULT    ABO/RH(D) B POS      Antibody Screen Negative      SPECIMEN EXPIRATION DATE [id number]     ABO/RH TYPE AND SCREEN        ===============================================================  RADIOLOGY       Reviewed all pertinent imaging. Please see official radiology report.   MR Foot Right w/o & w Contrast   Final Result   IMPRESSION:   1.  Small, 9 mm rim-enhancing fluid collection along the dorsal aspect  of the great toe near the IP joint is concerning for a small abscess given the history of infection.      2.  There is a great toe IP joint effusion with mild synovitis. While this is favored to be reactive, septic arthritis of the great toe IP joint could also potentially cause this appearance.      3.  No evidence of osteomyelitis.            ================================================================  EKG     EKG reviewed interpreted by me shows sinus rhythm with rate of 85, normal axis,  with no acute ST or T wave changes    I have independently reviewed and interpreted the EKG(s) documented above.     ================================================================  PROCEDURES         I, Ary Veliz, am serving as a scribe to document services personally performed by Dr. Moy based on my observation and the provider's statements to me. I, Mei Moy MD attest that Ary Veliz is acting in a scribe capacity, has observed my performance of the services and has documented them in accordance with my direction.   Mei Moy M.D.   Emergency Medicine   [facility] EMERGENCY ROOM  [address] [address]  [phone]  Dept: [phone]      Mei Moy MD  06/28/22 1523       Mei Moy MD  06/28/22 8494

## 2022-06-28 NOTE — ANESTHESIA CARE TRANSFER NOTE
Patient: Harshad An Emma    Procedure: Procedure(s):  IRRIGATION AND DEBRIDEMENT, RIGHT GREAT TOE  REMOVAL, FOREIGN BODY, RIGHT GREAT TOE       Diagnosis: Inflammation of interphalangeal joint of right toe due to infection (H) [M00.9]  Diagnosis Additional Information: No value filed.    Anesthesia Type:   MAC     Note:    Oropharynx: oropharynx clear of all foreign objects  Level of Consciousness: awake  Oxygen Supplementation: room air    Independent Airway: airway patency satisfactory and stable  Dentition: dentition unchanged  Vital Signs Stable: post-procedure vital signs reviewed and stable  Report to RN Given: handoff report given  Patient transferred to: Medical/Surgical Unit    Handoff Report: Identifed the Patient, Identified the Reponsible Provider, Reviewed the pertinent medical history, Discussed the surgical course, Reviewed Intra-OP anesthesia mangement and issues during anesthesia, Set expectations for post-procedure period and Allowed opportunity for questions and acknowledgement of understanding      Vitals:  Vitals Value Taken Time   /70 06/28/22 1837   Temp 36.4  C (97.6  F) 06/28/22 1837   Pulse 72 06/28/22 1837   Resp 20 06/28/22 1837   SpO2 95 % 06/28/22 1837       Electronically Signed By: ANGEL Danielson CRNA  June 28, 2022  6:37 PM

## 2022-06-28 NOTE — LETTER
June 29, 2022      Harshad [facility]  [address]  [facility] [address]              To whom it may concern:    Please excuse Harshad as he was hospitalized at [facility] from 6/28/22-6/29/22.        Sincerely,       Lc Smalls DO

## 2022-06-28 NOTE — ED TRIAGE NOTES
Patient here after stubbing R great toe on Thursday last week, started on Cephalexin on Sunday for infection and it is getting progressively worse, despite ABX, some numbness, pain and tingling to R great toe.  Mandy Umana RN.......6/28/2022 12:51 PM     Triage Assessment     Row Name 06/28/22 1250       Triage Assessment (Adult)    Airway WDL WDL       Respiratory WDL    Respiratory WDL WDL       Skin Circulation/Temperature WDL    Skin Circulation/Temperature WDL WDL       Cardiac WDL    Cardiac WDL WDL       Peripheral/Neurovascular WDL    Peripheral Neurovascular WDL WDL       Cognitive/Neuro/Behavioral WDL    Cognitive/Neuro/Behavioral WDL WDL

## 2022-06-28 NOTE — OP NOTE
Operative Note    Name:  Harshad Carter  PCP:  Joey Alcantara  Procedure Date:  6/28/2022      Pre-Procedure Diagnosis:  1.  Right great toe dorsal abscess    Post-Procedure Diagnosis:    1.  Right great toe dorsal abscess  2.  Right great toe interphalangeal joint foreign body    Procedure: Procedure(s):  1.  Irrigation and excisional debridement right great toe with debridement of subcutaneous tissue, a small amount of EHL tendon, and abscess  2.  REMOVAL, FOREIGN BODY, RIGHT GREAT TOE      Surgeon(s):  Hermes Abbott MD    Assistant: Lucas Mishra PA-C.  The expert physician assistant was necessary for soft tissue retraction, hemostasis, closure of the wound, and for overall progression and efficiency of the case.    Anesthesia Type:  MAC with toe block    Estimated Blood Loss:   5 cc    Specimens:   ID Type Source Tests Collected by Time Destination   A : Right Great Toe Abcess Swab Toe, Right ANAEROBIC BACTERIAL CULTURE ROUTINE, GRAM STAIN, AEROBIC BACTERIAL CULTURE ROUTINE Hermes Abbott MD 6/28/2022  6:00 PM    B : Right Great Toe Abcess Tissue Toe, Right ANAEROBIC BACTERIAL CULTURE ROUTINE, GRAM STAIN, AEROBIC BACTERIAL CULTURE ROUTINE Hermes Abbott MD 6/28/2022  6:05 PM         Complications:    None    Findings: Abscess over the dorsal great toe.  6 mm shard of wood found in the great toe interphalangeal joint.    Indications for procedure: The patient is a pleasant 41-year-old male who last weekend stubbed his great toe on a fragment of wood, and had a puncture wound.  He noticed redness and swelling and presented to [facility] this past Sunday.  He was placed on oral antibiotics.  His redness and swelling worsen, and he presented again to [facility] today.  There was concern for a soft tissue abscess, and he was referred to the emergency department for evaluation.  MRI was obtained showing a dorsal soft tissue abscess.  I met with the patient at the bedside and we discussed the  risk, benefits, and alternatives to surgical intervention.  Specifically we discussed the risk of anesthesia, heart attack, stroke, blood clot, pneumonia, and death, as well as the risk of surgery including but not limited to bleeding, infection, damage to surrounding structures like blood vessels and nerves, postoperative numbness that may or may not resolve, failure of the infection to resolve, postoperative stiffness, and need for further surgery.  No guarantees were made or given.  After thorough discussion, the patient elected to proceed with irrigation and debridement.  Signed informed consent was obtained and placed in the chart.    Procedure: The patient was met in the preoperative holding area and the correct surgical site was marked with the patient's participation.  Informed consent was again reviewed with the patient, and all questions were answered to his satisfaction.  The patient was transferred into the operating room and placed supine on the operating table.  He had successful induction of MAC anesthesia.  Using sterile technique, a toe block was performed with 1% lidocaine plain.  A calf tourniquet was applied.  The right lower extremity was then prepped and draped in usual sterile fashion.  A timeout was performed with all members the operating team participating per hospital policy.  The correct patient, site, and procedure were all confirmed.  All in attendance were in agreement.  Preoperative antibiotic administration was confirmed.    The limb was exsanguinated by elevating the extremity for 3 minutes.  The tourniquet was insufflated to 250 mmHg.    There was a fluctuant area over the plantar lateral aspect of the distal great toe that had some enhancement on the MRI.  Thus a 5 mm poke hole incision was made over this area with care not to put the incision on the weightbearing surface of the foot.  A hemostat was used to enter the area of fluctuance, but no purulence was encountered.    A lazy  S shaped incision was made over the dorsal aspect of the interphalangeal joint.  Skin flaps were raised, and the extensor hallux longus tendon was identified.  Over the dorsal lateral aspect of the distal phalanx, there was an abscess that was entered, and culture swabs were taken.  A very small portion of necrotic EHL tendon was debrided, but the majority of the tendon was left intact.  Some subcutaneous necrotic tissue was debrided using a 15 blade and a rongeur.  An arthrotomy was made into the interphalangeal joint medial to the EHL tendon.  A 6 mm fragment of wood, foreign body was found in the interphalangeal joint and was extracted.  There was a small area over the dorsal proximal phalanx joint surface where the wound had clearly impacted and had left a small amount of foreign material in the cartilage.  This was debrided using a curette.  The joint as well as the abscess site were then copiously irrigated using 3 L of sterile saline.  The tissues were examined again, and there was no further necrotic tissue or infected appearing material noted.    Hemostasis was achieved using electrocautery.  The wound was loosely closed using 3-0 nylon in interrupted fashion.  A sterile dressing of Adaptic, 4 x 4 gauze, ABD pad, and sterile cast padding was applied.  Tourniquet cuff pressure was let down and adequate capillary refill was seen to return to the distal extremity.    The patient was then awakened from anesthesia and transferred to the PACU in good condition.  All sponge and needle counts were correct in the case.    POSTOPERATIVE PLAN  The patient will be heel weightbearing in a postoperative shoe postop.  He will maintain his dressing clean dry and intact.  He will have a dressing change on postoperative day #2.  We will obtain an infectious disease consult, and follow culture results to tailor antibiotics.  He will follow-up in my office in 1 week for wound check, and at 2 weeks for suture removal.    Hermes  MANDI Abbott MD    Date: 6/28/2022  Time: 6:22 PM      * No implants in log *

## 2022-06-28 NOTE — CONSULTS
ORTHOPEDIC CONSULTATION    CHIEF COMPLAINT: right great toe pain       HISTORY OF PRESENT ILLNESS:  The patient is seen in orthopedic consultation at the request of Sahil Rincon MD.  The patient is a 41 year old male with c/o right great toe pain.  The patient reports that over the weekend he was gathering sticks to build afford with his children in the woods.  He stubbed his right great toe on a stick and had a small puncture wound.  He had pain and swelling that worsened over the following few days and on Sunday presented to [facility].  He was placed on oral antibiotics.  His pain and swelling worsened over the course of the past 2 days, and he presented again to [facility] today with a red, hot, swollen toe.  There was concern for infection, and he was referred to the emergency department.  His CRP is mildly elevated, and an MRI was obtained.  Orthopedics is consulted for further evaluation and management.    The patient is otherwise fit and healthy.  He denies any chronic medical issues.  He has not had infections like this in the past.  He is not diabetic and a non-smoker.  He does chew tobacco occasionally.  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      PAST MEDICAL HISTORY:   Past Medical History:   Diagnosis Date     Anxiety      Hypertension        ALLERGIES:      Allergies   Allergen Reactions     Dust Mite Extract Other (See Comments)     Pollen Extract Other (See Comments)        MEDICATIONS ON ADMISSION:  Medications were reviewed.  They do include:   Medications Prior to Admission   Medication Sig Dispense Refill Last Dose     cephALEXin (KEFLEX) 500 MG capsule Take 500 mg by mouth 4 times daily   6/28/2022 at 8am; started on 6/26     lisinopril (ZESTRIL) 20 MG tablet Take 20 mg by mouth every evening   6/27/2022 at pm       SOCIAL HISTORY:         FAMILY HISTORY:  Family History   Problem Relation Age of Onset     Atrial fibrillation Mother      Prostate Cancer Father      Coronary Artery Disease  Brother        REVIEW OF SYSTEMS:   See Admission History and Physical     PHYSICAL EXAMINATION:    Temp:  [98.5  F (36.9  C)-98.9  F (37.2  C)] 98.5  F (36.9  C)  Pulse:  [73-92] 73  Resp:  [18-20] 18  BP: (138-158)/() 145/96  SpO2:  [99 %-100 %] 99 %    General: On examination, the patient is A&Ox3  SKIN/HAIR/NAILS: normal   Pulses:  Dorsalis pedis pulses intact  Sensation: intact bilateral lower extremities  HEENT: Normal.  Neuro: Patient is alert and interactive, no facial droop.  Psych: Normal affect, nonpressured speech.  Right lower extremity: There is swelling and redness about the distal aspect of the right great toe.  There is a small wound over the distal medial aspect of the toe.  The dorsal aspect is tender to palpation.  There is a small amount of fluctuance dorsally.  IP joint range of motion is not painful.  MTP joint range of motion is not painful.  The patient fires EHL and FHL without difficulty.  Sensation is intact light touch in the medial and lateral aspects of the great toe.    RADIOGRAPHIC EVALUATION:   I am not able to view x-rays of the right foot taken today in the [facility] system, but they have been read as negative.    MRI of the right great toe shows a fluid collection over the dorsal aspect of the right great toe IP joint.      LABORATORY DATA:   Lab Results   Component Value Date    INR 0.99 06/28/2022     White blood cell 8.8.  CRP 2.9.      IMPRESSION:   41-year-old male with a right great toe puncture wound now with abscess and cellulitis.     PLAN:  I discussed with the patient my findings today.  With the abscess and the cellulitis, I think he is unlikely to have significant improvement with IV antibiotics alone.  We will proceed to the operating room this evening for irrigation and debridement and packing of the wound.  We discussed the risk, benefits, alternatives to surgery with the risks including bleeding, infection, damage to surrounding structures like blood  vessels and nerves, failure of the infection to resolve, stiffness, and need for further surgery.  After thorough discussion, the patient elected to proceed.  We will proceed this evening as the patient is n.p.o.  We will plan for overnight in the hospital infectious disease consultation in the morning.  Antibiotics can be tailored based on culture results.    Hermes Abbott MD  [facility]

## 2022-06-28 NOTE — BRIEF OP NOTE
[facility]    Brief Operative Note    Pre-operative diagnosis: Inflammation of interphalangeal joint of right toe due to infection (H) [M00.9]  Post-operative diagnosis Same as pre-operative diagnosis, foreign body right great toe interphalangeal joint    Procedure: Procedure(s):  IRRIGATION AND DEBRIDEMENT, RIGHT GREAT TOE  REMOVAL, FOREIGN BODY, RIGHT GREAT TOE  Surgeon: Surgeon(s) and Role:     * Hermes Abbott MD - Primary     * Lucas Mishra PA-C - Assisting  Anesthesia: Choice   Estimated Blood Loss: 5 cc    Drains: None  Specimens:   ID Type Source Tests Collected by Time Destination   A : Right Great Toe Abcess Swab Toe, Right ANAEROBIC BACTERIAL CULTURE ROUTINE, GRAM STAIN, AEROBIC BACTERIAL CULTURE ROUTINE Hermes Abbott MD 6/28/2022  6:00 PM    B : Right Great Toe Abcess Tissue Toe, Right ANAEROBIC BACTERIAL CULTURE ROUTINE, GRAM STAIN, AEROBIC BACTERIAL CULTURE ROUTINE Hermes Abbott MD 6/28/2022  6:05 PM      Findings:   6 mm fragment of wood found in the interphalangeal joint.  Abscess over the dorsal lateral toe..  Complications: None.  Implants: * No implants in log *    Hermes Abbott MD  [facility]

## 2022-06-28 NOTE — ANESTHESIA PREPROCEDURE EVALUATION
Anesthesia Pre-Procedure Evaluation    Patient: Harshad Carter   MRN: [id number] : 1980        Procedure : Procedure(s):  IRRIGATION AND DEBRIDEMENT, RIGHT GREAT TOE          Past Medical History:   Diagnosis Date     Anxiety      Hypertension       Past Surgical History:   Procedure Laterality Date     wisdom teeth        Allergies   Allergen Reactions     Dust Mite Extract Other (See Comments)     Pollen Extract Other (See Comments)      Social History     Tobacco Use     Smoking status: Not on file     Smokeless tobacco: Not on file   Substance Use Topics     Alcohol use: Not on file      Wt Readings from Last 1 Encounters:   22 81.6 kg (180 lb)        Anesthesia Evaluation            ROS/MED HX  ENT/Pulmonary:  - neg pulmonary ROS     Neurologic:  - neg neurologic ROS     Cardiovascular:     (+) hypertension-----    METS/Exercise Tolerance:     Hematologic:  - neg hematologic  ROS     Musculoskeletal:       GI/Hepatic:  - neg GI/hepatic ROS     Renal/Genitourinary:  - neg Renal ROS     Endo:  - neg endo ROS     Psychiatric/Substance Use:  - neg psychiatric ROS     Infectious Disease:       Malignancy:       Other:            Physical Exam    Airway  airway exam normal           Respiratory Devices and Support         Dental  no notable dental history         Cardiovascular   cardiovascular exam normal          Pulmonary   pulmonary exam normal                OUTSIDE LABS:  CBC:   Lab Results   Component Value Date    WBC 8.8 2022    HGB 15.5 2022    HCT 44.7 2022     2022     BMP:   Lab Results   Component Value Date     2022    POTASSIUM 4.1 2022    CHLORIDE 102 2022    CO2 26 2022    BUN 11 2022    CR 0.78 2022     2022     COAGS:   Lab Results   Component Value Date    PTT 28 2022    INR 0.99 2022     POC: No results found for: BGM, HCG, HCGS  HEPATIC: No results found for: ALBUMIN, PROTTOTAL,  ALT, AST, GGT, ALKPHOS, BILITOTAL, BILIDIRECT, ORALIA  OTHER:   Lab Results   Component Value Date    SAI 9.5 06/28/2022    CRP 2.9 (H) 06/28/2022    SED 8 06/28/2022       Anesthesia Plan    ASA Status:  2, emergent       Anesthesia Type: MAC.              Consents    Anesthesia Plan(s) and associated risks, benefits, and realistic alternatives discussed. Questions answered and patient/representative(s) expressed understanding.    - Discussed:     - Discussed with:  Patient         Postoperative Care    Pain management: Multi-modal analgesia.   PONV prophylaxis: Ondansetron (or other 5HT-3)     Comments:                AMANDA AVILES MD

## 2022-06-28 NOTE — PHARMACY-VANCOMYCIN DOSING SERVICE
Pharmacy Vancomycin Initial Note  Date of Service 2022  Patient's  1980  41 year old, male    Indication: Abscess and Skin and Soft Tissue Infection    Current estimated CrCl = Estimated Creatinine Clearance: 143.8 mL/min (based on SCr of 0.78 mg/dL).    Creatinine for last 3 days  2022:  1:17 PM Creatinine 0.78 mg/dL    Recent Vancomycin Level(s) for last 3 days  No results found for requested labs within last 72 hours.      Vancomycin IV Administrations (past 72 hours)      No vancomycin orders with administrations in past 72 hours.                Nephrotoxins and other renal medications (From now, onward)    Start     Dose/Rate Route Frequency Ordered Stop    22 1529  naproxen (NAPROSYN) tablet 500 mg         500 mg Oral EVERY 12 HOURS PRN 22 1531            Contrast Orders - past 72 hours (72h ago, onward)    Start     Dose/Rate Route Frequency Stop    22 1430  gadobutrol (GADAVIST) injection 8 mL         8 mL Intravenous ONCE 22 1433          [facility] Prediction of Planned Initial Vancomycin Regimen  Loading dose: 1750 mg at 1630 2022.  Regimen: 1250 mg IV every 12 hours.  Start time: 0430 on 2022  Exposure target: AUC24 (range)400-600 mg/L.hr   AUC24,ss: 482 mg/L.hr  Probability of AUC24 > 400: 69 %  Ctrough,ss: 14.1 mg/L  Probability of Ctrough,ss > 20: 25 %  Probability of nephrotoxicity (Lodise LASHONDA ): 9 %     Plan:  1. Start vancomycin  1750mg iv x1 then 1250mg iv q12h   2. Vancomycin monitoring method: AUC  3. Vancomycin therapeutic monitoring goal: 400-600 mg*h/L  4. Pharmacy will check vancomycin levels as appropriate in 1-3 Days.    5. Serum creatinine levels will be ordered daily for the first week of therapy and at least twice weekly for subsequent weeks.      Parminder Hwang [facility]

## 2022-06-28 NOTE — H&P
"    [facility]    History and Physical - Hospitalist Service       Date of Admission:  6/28/2022    Assessment & Plan      Harshad Carter is a 41 year old male admitted on 6/28/2022. He has a history of hypertension and anxiety and is admitted for right great toe cellulitis.     Right Toe Cellulitis and abscess, concern for Septic joint  Toe pain since last Thursday after \"stubbing toe\" while playing with his kids.  Initially treated Sunday for cellulitis with Keflex.  Continued to have increasing pain and swelling today.  Normal white count, CRP slightly elevated 2.9.  MRI revealed no foreign body but a 9 mm fluid collection and likely reactive synovitis but concern for septic arthritis.  No evidence of osteomyelitis.  Orthopedic surgery consulted in ED and will take him for surgery tonight.  -Orthopedics consult, appreciate recommendations  -Ceftriaxone 1 g q24 hr  -Vancomycin, pharmacy to dose   -/hr  -NPO  -AM CBC and CRP  -0.5 mg Dilaudid for severe pain  -Tylenol and Naproxen PO PRN  -EKG pre op    Chronic Medical conditions  HTN: Hold PTA Lisinopril   Anxiety: Currently no medical treatment     Diet: NPO per Anesthesia Guidelines for Procedure/Surgery Except for: Meds  DVT Prophylaxis: Enoxaparin (Lovenox) SQ  Lundberg Catheter: Not present  Fluids: /hr  Central Lines: None  Cardiac Monitoring: None  Code Status: Full Code    The patient's care was discussed with the Attending Physician, Dr. Serna.    Lc Smalls DO  Hospitalist Service  [facility]  Securely message with the Vocera Web Console (learn more here)  Text page via [employer] Paging/Directory   _____________________________________________________________________    Chief Complaint   Right great toe pain    History is obtained from the patient    History of Present Illness   Harshad Carter is a 41 year old male who presents with right great toe pain, redness and swelling.  He " reports that while he was outside last Thursday, 6/23, he stubbed his toe [place].  Reports that the pain was worsening on Sunday along with increasing redness and swelling which point he went to urgent care and was started on cephalexin.  Unfortunately his pain and swelling continued to worsen.  He then returned to urgent care and was told to come to the ED for further evaluation with [facility].  Currently he endorses some great toe pain which is under control.  He states that he has some minor numbness and tingling in that toe but denies any fevers, chills, edema, chest pain, shortness of breath, gait abnormality.    Review of Systems    CONSTITUTIONAL: NEGATIVE for fever, chills, change in weight  EYES: NEGATIVE for vision changes or irritation  ENT/MOUTH: NEGATIVE for ear, mouth and throat problems  RESP: NEGATIVE for significant cough or SOB  CV: NEGATIVE for chest pain, palpitations or peripheral edema  GI: NEGATIVE for nausea, abdominal pain, heartburn, or change in bowel habits  : NEGATIVE for frequency, dysuria, or hematuria  MUSCULOSKELETAL: Positive for right great toe joint swelling, redness, numbness  NEURO: NEGATIVE for weakness, dizziness or paresthesias  ENDOCRINE: NEGATIVE for temperature intolerance, skin/hair changes  HEME: NEGATIVE for bleeding problems  PSYCHIATRIC: NEGATIVE for changes in mood or affect    Past Medical History    I have reviewed this patient's medical history and updated it with pertinent information if needed.   Past Medical History:   Diagnosis Date     Anxiety      Hypertension      Past Surgical History   I have reviewed this patient's surgical history and updated it with pertinent information if needed.  Past Surgical History:   Procedure Laterality Date     wisdom teeth          Social History   I have reviewed this patient's social history and updated it with pertinent information if needed. Harshad Carter   works as  a salesman.     Family History   I have reviewed this patient's family history and updated it with pertinent information if needed.  Family History   Problem Relation Age of Onset     Atrial fibrillation Mother      Prostate Cancer Father      Coronary Artery Disease Brother      Prior to Admission Medications   Prior to Admission Medications   Prescriptions Last Dose Informant Patient Reported? Taking?   cephALEXin (KEFLEX) 500 MG capsule 6/28/2022 at 8am; started on 6/26  Yes Yes   Sig: Take 500 mg by mouth 4 times daily   lisinopril (ZESTRIL) 20 MG tablet 6/27/2022 at pm  Yes Yes   Sig: Take 20 mg by mouth every evening      Facility-Administered Medications: None     Allergies   Allergies   Allergen Reactions     Dust Mite Extract Other (See Comments)     Pollen Extract Other (See Comments)       Physical Exam   Vital Signs: Temp: 98.9  F (37.2  C) Temp src: Oral BP: (!) 158/109 Pulse: 92   Resp: 18 SpO2: 100 % O2 Device: None (Room air)    Weight: 180 lbs 0 oz  General: alert, appears comfortable, no acute distress  HEENT: atraumatic, conjunctiva clear without erythema, EOM's intact, no nasal discharge  Neck: supple  Cardiac: RRR w/o audible murmur  Resp: bilateral clear lungs w/o wheezing, crackles or rhonchi; breathing comfortably on RA  Abdomen: soft, non-tender to palpation, no masses. BS normal  Neuro: grossly normal CN; normal strength, sensation, & tone  Psych: affect congruent with mood  Extremity: Right great toe with swelling and inflammation, good pulses, normal movement, reports slight numbness and tingling      Data   Data reviewed today: I reviewed all medications, new labs and imaging results over the last 24 hours. I personally reviewed the MRI image(s) showing abscess with reactive synovitis vs. septic arthritis, no osteomyelitis.    Recent Labs   Lab 06/28/22  1317   WBC 8.8   HGB 15.5   MCV 92      INR 0.99      POTASSIUM 4.1   CHLORIDE 102   CO2 26   BUN 11   CR 0.78   ANIONGAP  10   SAI 9.5

## 2022-06-28 NOTE — PROGRESS NOTES
"POST-OP CHECK    Harshad Carter was seen post-operatively after irrigation and debridement of right great toe. Doing fine overall. Endorses pain at surgical site that is controlled on current pain medication regimen. Denies any shortness of breath, chest pain/discomfort, nausea, vomiting. All questions were answered.     /70   Pulse 72   Temp 97.6  F (36.4  C)   Resp 20   Ht 1.854 m (6' 1\")   Wt 81.6 kg (180 lb)   SpO2 95%   BMI 23.75 kg/m    GENERAL: Alert. No acute distress.   HEENT: PERRLA. No scleral icterus or conjunctival injection.  SKIN: Warm and dry. No bruises, rashes, or skin lesions.  LUNGS: Normal work of breathing with no use of accessory muscles. Clear breath sounds in all lung fields bilaterally with no wheezes or crackles appreciated.  CARDIAC: RRR. Normal S1 and S2. No murmurs, clicks, or rubs appreciated.  NEUROLOGIC: Alert and oriented. CN II-XII intact bilaterally. Sensation to light touch involving upper and lower extremities intact bilaterally.  EXTREMITIES: Sterile cast padding over the right foot. Appears well-perfused.       Assessment/plan: continue on current plan as detailed in today's formal progress note.    Patient dicussed with attending physician, Dr. Sahil Serna , who agrees with the plan.       Wilmer Lott MD, PGY-2  [facility]  06/28/22      "

## 2022-06-28 NOTE — PHARMACY-ADMISSION MEDICATION HISTORY
Pharmacy Note - Admission Medication History    Pertinent Provider Information:      ______________________________________________________________________    Prior To Admission (PTA) med list completed and updated in EMR.       PTA Med List   Medication Sig Last Dose     cephALEXin (KEFLEX) 500 MG capsule Take 500 mg by mouth 4 times daily 6/28/2022 at 8am; started on 6/26     lisinopril (ZESTRIL) 20 MG tablet Take 20 mg by mouth every evening 6/27/2022 at pm       Information source(s): Patient and CareEverywhere/Corey  Method of interview communication: in-person    Summary of Changes to PTA Med List  New: cephalexin  Discontinued: none  Changed: none    Patient was asked about OTC/herbal products specifically.  PTA med list reflects this.    In the past week, patient estimated taking medication this percent of the time:  greater than 90%.    Allergies were reviewed, assessed, and updated with the patient.      Patient does not use any multi-dose medications prior to admission.    The information provided in this note is only as accurate as the sources available at the time of the update(s).    Thank you for the opportunity to participate in the care of this patient.    Freda Mendoza  6/28/2022 3:33 PM

## 2022-06-29 ENCOUNTER — APPOINTMENT (OUTPATIENT)
Dept: PHYSICAL THERAPY | Facility: CLINIC | Age: 42
End: 2022-06-29
Attending: PHYSICIAN ASSISTANT
Payer: COMMERCIAL

## 2022-06-29 VITALS
HEART RATE: 65 BPM | TEMPERATURE: 97.8 F | BODY MASS INDEX: 23.86 KG/M2 | OXYGEN SATURATION: 97 % | RESPIRATION RATE: 18 BRPM | HEIGHT: 73 IN | WEIGHT: 180 LBS | DIASTOLIC BLOOD PRESSURE: 96 MMHG | SYSTOLIC BLOOD PRESSURE: 143 MMHG

## 2022-06-29 PROBLEM — L02.611 ABSCESS OF GREAT TOE OF RIGHT FOOT: Status: ACTIVE | Noted: 2022-06-29

## 2022-06-29 PROBLEM — M00.9: Status: ACTIVE | Noted: 2022-06-28

## 2022-06-29 LAB
ATRIAL RATE - MUSE: 85 BPM
C REACTIVE PROTEIN LHE: 1.8 MG/DL (ref 0–?)
CREAT SERPL-MCNC: 0.8 MG/DL (ref 0.7–1.3)
DIASTOLIC BLOOD PRESSURE - MUSE: NORMAL MMHG
ERYTHROCYTE [DISTWIDTH] IN BLOOD BY AUTOMATED COUNT: 11.9 % (ref 10–15)
GFR SERPL CREATININE-BSD FRML MDRD: >90 ML/MIN/1.73M2
GLUCOSE BLDC GLUCOMTR-MCNC: 154 MG/DL (ref 70–99)
GRAM STAIN RESULT: NORMAL
GRAM STAIN RESULT: NORMAL
HCT VFR BLD AUTO: 41.4 % (ref 40–53)
HGB BLD-MCNC: 14.3 G/DL (ref 13.3–17.7)
INTERPRETATION ECG - MUSE: NORMAL
MCH RBC QN AUTO: 31.6 PG (ref 26.5–33)
MCHC RBC AUTO-ENTMCNC: 34.5 G/DL (ref 31.5–36.5)
MCV RBC AUTO: 92 FL (ref 78–100)
MRSA DNA SPEC QL NAA+PROBE: NEGATIVE
P AXIS - MUSE: 37 DEGREES
PLATELET # BLD AUTO: 250 10E3/UL (ref 150–450)
PR INTERVAL - MUSE: 128 MS
QRS DURATION - MUSE: 80 MS
QT - MUSE: 382 MS
QTC - MUSE: 454 MS
R AXIS - MUSE: 50 DEGREES
RBC # BLD AUTO: 4.52 10E6/UL (ref 4.4–5.9)
SA TARGET DNA: NEGATIVE
SYSTOLIC BLOOD PRESSURE - MUSE: NORMAL MMHG
T AXIS - MUSE: 47 DEGREES
VENTRICULAR RATE- MUSE: 85 BPM
WBC # BLD AUTO: 8.4 10E3/UL (ref 4–11)

## 2022-06-29 PROCEDURE — 258N000003 HC RX IP 258 OP 636

## 2022-06-29 PROCEDURE — 82962 GLUCOSE BLOOD TEST: CPT

## 2022-06-29 PROCEDURE — 97116 GAIT TRAINING THERAPY: CPT | Mod: GP

## 2022-06-29 PROCEDURE — G0378 HOSPITAL OBSERVATION PER HR: HCPCS

## 2022-06-29 PROCEDURE — 99217 PR OBSERVATION CARE DISCHARGE: CPT | Mod: GC

## 2022-06-29 PROCEDURE — 250N000011 HC RX IP 250 OP 636

## 2022-06-29 PROCEDURE — 36415 COLL VENOUS BLD VENIPUNCTURE: CPT

## 2022-06-29 PROCEDURE — 96376 TX/PRO/DX INJ SAME DRUG ADON: CPT

## 2022-06-29 PROCEDURE — 86140 C-REACTIVE PROTEIN: CPT

## 2022-06-29 PROCEDURE — 250N000013 HC RX MED GY IP 250 OP 250 PS 637: Performed by: PHYSICIAN ASSISTANT

## 2022-06-29 PROCEDURE — 82565 ASSAY OF CREATININE: CPT

## 2022-06-29 PROCEDURE — 99204 OFFICE O/P NEW MOD 45 MIN: CPT | Performed by: STUDENT IN AN ORGANIZED HEALTH CARE EDUCATION/TRAINING PROGRAM

## 2022-06-29 PROCEDURE — 250N000011 HC RX IP 250 OP 636: Performed by: PHYSICIAN ASSISTANT

## 2022-06-29 PROCEDURE — 85027 COMPLETE CBC AUTOMATED: CPT

## 2022-06-29 PROCEDURE — 97161 PT EVAL LOW COMPLEX 20 MIN: CPT | Mod: GP

## 2022-06-29 RX ORDER — ACETAMINOPHEN 325 MG/1
975 TABLET ORAL EVERY 8 HOURS
Qty: 90 TABLET | Refills: 0 | Status: SHIPPED | OUTPATIENT
Start: 2022-06-29

## 2022-06-29 RX ORDER — DOXYCYCLINE HYCLATE 100 MG
100 TABLET ORAL 2 TIMES DAILY
Qty: 14 TABLET | Refills: 0 | Status: SHIPPED | OUTPATIENT
Start: 2022-06-29 | End: 2022-07-06

## 2022-06-29 RX ORDER — IBUPROFEN 600 MG/1
600 TABLET, FILM COATED ORAL EVERY 6 HOURS PRN
Qty: 90 TABLET | Refills: 0 | Status: SHIPPED | OUTPATIENT
Start: 2022-06-29

## 2022-06-29 RX ADMIN — VANCOMYCIN HYDROCHLORIDE 1250 MG: 5 INJECTION, POWDER, LYOPHILIZED, FOR SOLUTION INTRAVENOUS at 06:31

## 2022-06-29 RX ADMIN — CEFAZOLIN SODIUM 2 G: 2 INJECTION, SOLUTION INTRAVENOUS at 05:50

## 2022-06-29 RX ADMIN — ACETAMINOPHEN 975 MG: 325 TABLET ORAL at 05:50

## 2022-06-29 RX ADMIN — ASPIRIN 81 MG: 81 TABLET, COATED ORAL at 09:52

## 2022-06-29 RX ADMIN — HYDROMORPHONE HYDROCHLORIDE 0.4 MG: 0.2 INJECTION, SOLUTION INTRAMUSCULAR; INTRAVENOUS; SUBCUTANEOUS at 01:15

## 2022-06-29 NOTE — PROGRESS NOTES
06/29/22 1045   Quick Adds   Type of Visit Initial PT Evaluation   Living Environment   People in Home child(gretchen), dependent;spouse   Current Living Arrangements house   Home Accessibility stairs to enter home;stairs within home   Number of Stairs, Main Entrance 1   Stair Railings, Main Entrance none   Number of Stairs, Within Home, Primary eight   Stair Railings, Within Home, Primary railing on right side (ascending)   Self-Care   Usual Activity Tolerance good   Current Activity Tolerance moderate   Equipment Currently Used at Home none   Fall history within last six months no   General Information   Onset of Illness/Injury or Date of Surgery 06/28/22   Referring Physician Hemres Abbott MD   Patient/Family Therapy Goals Statement (PT) home   Pertinent History of Current Problem (include personal factors and/or comorbidities that impact the POC) s/p R great toe I&D   Existing Precautions/Restrictions weight bearing   Weight-Bearing Status - LLE full weight-bearing   Weight-Bearing Status - RLE other (see comments)  (heel weight bearing with ortho shoe)   Range of Motion (ROM)   ROM Comment WFL   Strength (Manual Muscle Testing)   Strength Comments WFL   Bed Mobility   Bed Mobility supine-sit   Supine-Sit [place] (Bed Mobility) independent   Transfers   Transfers sit-stand transfer   Sit-Stand Transfer   Sit-Stand [place] (Transfers) supervision   Gait/Stairs (Locomotion)   [place] Level (Gait) contact guard   Assistive Device (Gait) crutches, axillary;other (see comments)  (None)   Distance in Feet (Required for LE Total Joints) 10'   Pattern (Gait) step-to   Deviations/Abnormal Patterns (Gait) ramonita decreased;gait speed decreased   Maintains Weight-bearing Status (Gait) able to maintain   Negotiation (Stairs) other (see comments)  (declines stair trial)   Clinical Impression   Criteria for Skilled Therapeutic Intervention Yes, treatment indicated   PT Diagnosis (PT) impaired functional mobility    Influenced by the following impairments gait, transfers   Functional limitations due to impairments WB/orthopedic restrictions   Clinical Presentation (PT Evaluation Complexity) Stable/Uncomplicated   Clinical Presentation Rationale pt presents as diagnosed   Clinical Decision Making (Complexity) low complexity   Planned Therapy Interventions (PT) balance training;bed mobility training;gait training;home exercise program;patient/family education;ROM (range of motion);stair training;strengthening;transfer training   Anticipated Equipment Needs at Discharge (PT) other (see comments)  (None)   Risk & Benefits of therapy have been explained care plan/treatment goals reviewed;patient   PT Discharge Planning   PT Discharge Recommendation (DC Rec) (S)  home   PT Rationale for DC Rec home with assist from family as needed   Plan of Care Review   Plan of Care Reviewed With patient   Total Evaluation Time   Total Evaluation Time (Minutes) 10   Physical Therapy Goals   PT Frequency One time eval and treatment only   PT Predicted Duration/Target Date for Goal Attainment 06/29/22   PT Goals Transfers;Gait;PT Goal 1   PT: Transfers Independent;Within precautions;Goal Met   PT: Gait Independent;Within precautions;Goal Met;50 feet   PT: Goal 1 pt verbalizes WB precautions and restrictions independently, Goal Met

## 2022-06-29 NOTE — PLAN OF CARE
Problem: Plan of Care - These are the overarching goals to be used throughout the patient stay.    Goal: Plan of Care Review/Shift Note  Outcome: Ongoing, Progressing   Pt c/o pain to right great toe, given prn dilaudid with improvement. Dressing to right great toe intact, no drainage noted. CMS intact. Pulse ox in place. IVF running. IV abx given. BG POD1 completed. Tolerating regular diet. NWB to right foot, using bedside urinal.

## 2022-06-29 NOTE — PROGRESS NOTES
S: Pt. seen at [facility]. Male. Jesus JACOBS. RX: Post op shoe. DX: Foot surgery.  O:A: Pt. already has a shoe.  P: Pt. to be seen as needed.    Norris HAND,NITA

## 2022-06-29 NOTE — DISCHARGE SUMMARY
Discharge Summary    Primary Care Physician:  Joey Alcantara  Discharge Provider: Lc Smalls DO   Consult/s: Orthopedic surgery, Infectious disease  Admission Date: 6/28/2022.   Admission Diagnoses:   Toe infection [L08.9]  Septic arthritis, due to unspecified organism, septic arthritis of unspecified location (H) [M00.9]   Discharge Date: 6/29/2022  Disposition: Home  Condition at Discharge: Stable  Code Status: Prior  Principal Diagnosis:  Abscess of great toe of right foot  Discharge Diagnoses:    Principal Problem:    Abscess of great toe of right foot  Active Problems:    Inflammation of interphalangeal joint of right toe due to infection (H)    Anxiety    Essential hypertension    Neck pain    Reason for Admission:   Harshad Carter is a 42 year old male who presented on the day of admission with right great toe infection due foreign body with removal of foreign body and irrigation and debridement.     Hospital Summary:   Harshad Carter is a 42 year old male admitted on 6/28/2022 past medical history of hypertension and anxiety.  He was admitted due to right great toe pain initially attempted to be treated outpatient with oral cephalexin.  The pain worsened and he presented again and was told to come to the emergency department.  MRI unfortunately showed no foreign body but did show 9 mm rim-enhancing fluid collection along the dorsal aspect of the great toe with concern for septic arthritis.  Patient was initially treated with IV vancomycin and ceftriaxone.  He had surgery on the evening of 6/28/2022 with orthopedic surgery which found a 6 mm fragment of wood found in the interphalangeal joint, abscess over the dorsal lateral toe.  Foreign body was removed and was irrigated well.  He stayed overnight for continued monitoring and IV antibiotics.  Infectious disease was consulted prior to discharge and recommended discharge on Augmentin and doxycycline.  Pain was controlled without opioid  analgesics.  PT saw the patient and recommended home.    Discharge Medications:       Review of your medicines      START taking      Dose / Directions   acetaminophen 325 MG tablet  Commonly known as: TYLENOL  Used for: Abscess of great toe of right foot      Dose: 975 mg  Take 3 tablets (975 mg) by mouth every 8 hours  Quantity: 90 tablet  Refills: 0     amoxicillin-clavulanate 875-125 MG tablet  Commonly known as: AUGMENTIN  Used for: Abscess of great toe of right foot      Dose: 1 tablet  Take 1 tablet by mouth 2 times daily for 7 days  Quantity: 14 tablet  Refills: 0     doxycycline hyclate 100 MG tablet  Commonly known as: VIBRA-TABS  Used for: Abscess of great toe of right foot      Dose: 100 mg  Take 1 tablet (100 mg) by mouth 2 times daily for 7 days  Quantity: 14 tablet  Refills: 0     ibuprofen 600 MG tablet  Commonly known as: ADVIL/MOTRIN  Used for: Abscess of great toe of right foot      Dose: 600 mg  Take 1 tablet (600 mg) by mouth every 6 hours as needed for other (inflammatory pain)  Quantity: 90 tablet  Refills: 0        CONTINUE these medicines which have NOT CHANGED      Dose / Directions   lisinopril 20 MG tablet  Commonly known as: ZESTRIL      Dose: 20 mg  Take 20 mg by mouth every evening  Refills: 0        STOP taking    cephALEXin 500 MG capsule  Commonly known as: KEFLEX              Where to get your medicines      These medications were sent to [id number] - [place] [address] AT [facility] [address] [address]    Phone: [phone]     acetaminophen 325 MG tablet    amoxicillin-clavulanate 875-125 MG tablet    doxycycline hyclate 100 MG tablet    ibuprofen 600 MG tablet       Significant Laboratory Studies:   Recent Labs   Lab 06/28/22  1317      CO2 26   BUN 11     Recent Labs   Lab 06/29/22  0754 06/28/22  1317   WBC 8.4 8.8   HGB 14.3 15.5   HCT 41.4 44.7    231     Significant Radiology Studies:     MR Foot Right w/o & w Contrast  Result Date: 6/28/2022  IMPRESSION: 1.  Small, 9 mm rim-enhancing fluid collection along the dorsal aspect of the great toe near the IP joint is concerning for a small abscess given the history of infection. 2.  There is a great toe IP joint effusion with mild synovitis. While this is favored to be reactive, septic arthritis of the great toe IP joint could also potentially cause this appearance. 3.  No evidence of osteomyelitis.    Discharge Instructions:  Follow up appointment with Primary Care Physician: Joey Alcantara within 2 weeks.  Follow up appointment with Specialist: Dr. Abbott in 1 week in orthopedic surgery.   Follow up test / procedure to do as outpatient: None    Diet: Regular  Activity: Per orthopedic surgery  Restrictions: No driving until follow up.   Wound / drain care: Per orthopedic surgery  The following tests have been done with results pending: Cultures.      Physical Exam:    Temp:  [97.3  F (36.3  C)-98.2  F (36.8  C)] 97.8  F (36.6  C)  Pulse:  [53-77] 65  Resp:  [14-20] 18  BP: (116-145)/(64-96) 143/96  SpO2:  [95 %-100 %] 97 %  General: alert, appears comfortable, no acute distress  HEENT: atraumatic, conjunctiva clear without erythema, EOM's intact, no nasal discharge  Neck: supple  Cardiac: RRR w/o audible murmur  Resp: bilateral clear lungs w/o wheezing, crackles or rhonchi; breathing comfortably on RA  Abdomen: soft, non-tender to palpation, no masses. BS normal  Neuro: grossly normal CN; normal strength, sensation, & tone  Psych: affect congruent with mood  Extremity: Right great toe wrapped, good pulses, normal movement    (Image prior to surgery)         This note was created with help of Dragon dictation system. Grammatical/typing errors are not intentional.     Patient discussed with attending physician, Dr. Sahil Serna , who agrees with the plan.      Lc Smalls DO PGY1 6/29/2022  [facility] Medicine Residency  Program

## 2022-06-29 NOTE — PROGRESS NOTES
Physical Therapy Discharge Summary    Reason for therapy discharge:    All goals and outcomes met, no further needs identified.    Progress towards therapy goal(s). See goals on Care Plan [facility] health record for goal details.  Goals met    Therapy recommendation(s):    No further therapy is recommended.

## 2022-06-29 NOTE — PROGRESS NOTES
Ortho Post op Check    S: Doing well, minimal pain. Denies chest pain or shortness of breath.     O:   Gen: resting in bed, alert and oriented, no apparent distress.  Respiratory: Breathing unlabored.  Right lower extremity: Sterile dressing in place is clean, dry, and intact.  Patient fires EHL and FHL.  The distal aspect of the great toe is pink and well perfused and has brisk capillary refill.  Sensation is intact to light touch although there is some dulled sensation over the distal aspect of the great toe from his toe block.  Compartments are soft and compressible and he has no pain with passive stretch of his toes.    Assessment: Postoperative day #0 status post irrigation and debridement right great toe and removal of foreign body, doing well postop.    Plan: Proceed per operative note.    Hermes Abbott MD  [facility]

## 2022-06-29 NOTE — PROGRESS NOTES
Care Management Discharge Note    Discharge Date: 06/29/2022       Discharge Disposition: Home    Discharge Services: None    Discharge DME: None    Discharge Transportation:      Private pay costs discussed: Not applicable    PAS Confirmation Code:  (n/a)  Patient/family educated on Medicare website which has current facility and service quality ratings: no    Education Provided on the Discharge Plan: No    Persons Notified of Discharge Plans: Pt   Patient/Family in Agreement with the Plan: yes    Handoff Referral Completed: None    Additional Information:  Chart reviewed.  Pt lives with spouse. No CM recommendations. Family to transport.     HILDA Riley

## 2022-06-29 NOTE — PROGRESS NOTES
"Orthopedic Progress Note      Assessment: 1 Day Post-Op  S/P Procedure(s):  IRRIGATION AND DEBRIDEMENT, RIGHT GREAT TOE  REMOVAL, FOREIGN BODY, RIGHT GREAT TOE     Plan:   - Continue PT.  - Weightbearing status: Heel weight bearing only in post-op shoe  - Anticoagulation: SCDs, leo stockings and early ambulation.  - Discharge planning: Discharge to home today. Ortho discharge orders placed. Follow-up with Dr. Abbott in 1 week.    Subjective:  Pain: well controlled on tylenol  Chest pain, SOB: no  Nausea, Vomiting:  no  Lightheadedness, Dizziness:  no  Neuro:  Patient denies new onset numbness or paresthesias in Right LE, foot & toes    Patient doing well on POD #1. Ready for discharge. Moving toes.     Objective:  BP (!) 143/96 (BP Location: Left arm)   Pulse 65   Temp 97.8  F (36.6  C) (Oral)   Resp 18   Ht 1.854 m (6' 1\")   Wt 81.6 kg (180 lb)   SpO2 97%   BMI 23.75 kg/m    The patient is A&Ox3. Appears comfortable.   Sensation is intact in all toes on Right foot.  Right ankle dorsiflexion, plantar flexion, Right EHL & FHL intact.   Right orsalis pedis pulse intact, Right great toe pink, warm & well perfused  Calves are soft and non-tender. Negative Mikayla's.  The incision is covered. Dressing C/D/I.    Pertinent Labs   Lab Results: personally reviewed.   Lab Results   Component Value Date    INR 0.99 06/28/2022     Lab Results   Component Value Date    WBC 8.4 06/29/2022    HGB 14.3 06/29/2022    HCT 41.4 06/29/2022    MCV 92 06/29/2022     06/29/2022     Lab Results   Component Value Date     06/28/2022    CO2 26 06/28/2022     Report completed by:  Luann Lee PA-C  [facility]    Date: 6/29/2022  Time: 12:43 PM    "

## 2022-06-29 NOTE — CONSULTS
Consultation - INFECTIOUS DISEASE CONSULTATION  Harshad Carter,  1980, MRN [id number]      Toe infection [L08.9]  Septic arthritis, due to unspecified organism, septic arthritis of unspecified location (H) [M00.9]    PCP: Joey Alcantara, [phone]   Code status:  Full Code               Chief Complaint:      Assessment:  Harshad Carter is a 42 year old old male with   Right great toe dorsal abscess/right great toe interphalangeal joints foreign body status post irrigation and excisional debridement of right great toe with debridement of subcutaneous tissue with a small amount of EHL and an abscess and removal of foreign body right great toe by Dr. Abbott on 2022.  Cultures in process.  On IV vancomycin and ceftriaxone.    Principal Problem:    Inflammation of interphalangeal joint of right toe due to infection (H)  Active Problems:    Anxiety    Essential hypertension    Neck pain        Recommendations:   Continue IV vancomycin and ceftriaxone inpatient.  Okay to discharge from the ID standpoint on p.o. doxycycline 100 mg twice daily and p.o. Augmentin 875/125 mg twice daily.  Prescription sent for 7 days.  I will follow-up on pending cultures and adjust antibiotic therapy as indicated.    Discussed with the patient, nursing staff.    Thank you for letting us be part of the patient care team. We will sign off.    Brandy Hunt MD,MD  [facility].   [facility]  Office Telephone [phone].  Fax [phone]  [facility] paging    HPI:    Harshad Carter is a 42 year old old male. History is provided by the patient, chart.    ID is asked to see this patient for right great toe infection post trauma.  The patient has a history of anxiety and hypertension.  The patient sustained a penetrating wound injury with some wood while building a fort for his daughter a week ago tomorrow.  Was seen in urgent care and started on Keflex that  he took for 3 days without any significant improvement.  He is now status post debridement of abscess and removal of the foreign body.  Cultures in process.  Seen today the patient reports feeling much better.  Today is his birthday and he would like to go home.  Cultures pending.    ===========================================    Medical History  Past Medical History:   Diagnosis Date     Anxiety      Hypertension         Surgical History  Past Surgical History:   Procedure Laterality Date     wisdom teeth              Social History  Reviewed, and he  reports that he has never smoked. He has never used smokeless tobacco.        Family History  Family History   Problem Relation Age of Onset     Atrial fibrillation Mother      Prostate Cancer Father      Coronary Artery Disease Brother       Psychosocial Needs  Social History     Social History Narrative     Not on file     Additional psychosocial needs reviewed per nursing assessment.       Allergies   Allergen Reactions     Dust Mite Extract Other (See Comments)     Pollen Extract Other (See Comments)        Medications Prior to Admission   Medication Sig Dispense Refill Last Dose     cephALEXin (KEFLEX) 500 MG capsule Take 500 mg by mouth 4 times daily   6/28/2022 at 8am; started on 6/26     lisinopril (ZESTRIL) 20 MG tablet Take 20 mg by mouth every evening   6/27/2022 at pm        Review of Systems:  Negative except for findings in the HPI Physical Exam:  Temp:  [97.3  F (36.3  C)-98.9  F (37.2  C)] 97.7  F (36.5  C)  Pulse:  [53-92] 63  Resp:  [14-20] 17  BP: (116-158)/() 131/82  SpO2:  [95 %-100 %] 97 %    Gen: Pleasant in no acute distress.  HEENT: NCAT. EOMI. PERRL.  Neck: No bruit, JVD or thyromegaly.  Lungs: Clear to ascultation bilat with no crackles or wheezes.  Card: RRR. NSR. No RMG. Peripheral pulses present and symmetric. No edema.  Abd: Soft NT ND. No mass. Normal bowel sounds.  Skin: No rash.  Extr: Surgical dressing in place on the  toes.  Neuro: Alert and oriented to place time and person.        ============================    Pertinent Labs  personally reviewed.   Recent Labs   Lab 06/29/22  0754 06/28/22  1317   WBC 8.4 8.8   HGB 14.3 15.5   HCT 41.4 44.7    231       Recent Labs   Lab 06/28/22  1317      CO2 26   BUN 11       MICROBIOLOGY DATA:  Personally reviewed      Pertinent Radiology  personally reviewed.    Study Result    Narrative & Impression   EXAM: MR FOOT RIGHT W/O and W CONTRAST  LOCATION: [facility]  DATE/TIME: 6/28/2022 1:51 PM     INDICATION: Worsening toe infection, eval for osteomyelitis.  COMPARISON: None.  TECHNIQUE: Routine. Additional postgadolinium T1 sequences were obtained.  IV CONTRAST: 8 mL Gadavist given     FINDINGS:   Along the plantar and slightly lateral aspect of the 1st toe, there is a small rim-enhancing fluid collection measuring 9 mm abutting the great toe IP joint. There is no evidence of osteomyelitis. There is an adjacent great toe IP joint effusion with   mild synovitis on postcontrast sequences. There is no subchondral bone marrow edema or abnormal bony enhancement, however, across the joint space.     The remaining bones of the forefoot show no abnormal enhancement or cortical destructive changes. No acute fracture.     No acute tendon abnormality. Muscle bulk is maintained. Scattered areas of subcutaneous soft tissue edema and reticulation in the forefoot.                                                                      IMPRESSION:  1.  Small, 9 mm rim-enhancing fluid collection along the dorsal aspect of the great toe near the IP joint is concerning for a small abscess given the history of infection.     2.  There is a great toe IP joint effusion with mild synovitis. While this is favored to be reactive, septic arthritis of the great toe IP joint could also potentially cause this appearance.     3.  No evidence of osteomyelitis.

## 2022-07-03 LAB
BACTERIA ABSC ANAEROBE+AEROBE CULT: NO GROWTH
BACTERIA TISS BX CULT: NO GROWTH

## 2022-07-05 ENCOUNTER — TELEPHONE (OUTPATIENT)
Dept: INFECTIOUS DISEASES | Facility: CLINIC | Age: 42
End: 2022-07-05

## 2022-07-05 LAB
BACTERIA ABSC ANAEROBE+AEROBE CULT: ABNORMAL
BACTERIA ABSC ANAEROBE+AEROBE CULT: ABNORMAL
BACTERIA TISS BX CULT: NORMAL

## 2022-07-05 NOTE — TELEPHONE ENCOUNTER
Cultures reviewed. Patient called and message left with callback number. Doxy + Augmentin a good regimen.    Anaerobic Bacterial Culture Routine  Order: [id number]   Collected 6/28/2022  6:00 PM     Status: Final result     Visible to patient: No (scheduled for 7/5/2022  8:44 AM)    Specimen Information: Toe, Right; Abscess         0 Result Notes    Culture No anaerobic organisms isolated       Isolated in broth only Pantoea agglomerans Abnormal     On day 2 of incubation   Not isolated or reported on routine aerobic culture        Resulting Agency: [facility]       Susceptibility     Pantoea agglomerans     TERRI     Cefepime <=1 ug/mL Susceptible     Ceftazidime <=1 ug/mL Susceptible     Ceftriaxone <=1 ug/mL Susceptible     Ciprofloxacin <=0.25 ug/mL Susceptible     Gentamicin <=1 ug/mL Susceptible     Levofloxacin <=0.12 ug/mL Susceptible     Meropenem <=0.25 ug/mL Susceptible     Piperacillin/Tazobactam <=4 ug/mL Susceptible     Tobramycin <=1 ug/mL Susceptible     Trimethoprim/Sulfamethoxazole <=1/19 ug/mL Susceptible                  Specimen Collected: 06/28/22  6:00 PM Last Resulted: 07/05/22  7:44 AM             Brandy Hunt MD

## 2022-07-06 NOTE — TELEPHONE ENCOUNTER
Pt called back and I informed of the below. Pt understands and took his last dose of abx this morning.

## (undated) DEVICE — SOL WATER IRRIG 1000ML BOTTLE 2F7114

## (undated) DEVICE — Device

## (undated) DEVICE — MAT FLOOR SURGICAL 40X38 0702140238

## (undated) DEVICE — SOLUTION IRRIG 2B7127 .9NS 3000ML BAG

## (undated) DEVICE — TUBING IRRIG TUR Y TYPE 96" LF 6543-01

## (undated) DEVICE — PREP SCRUB SOL EXIDINE 4% CHG 4OZ 29002-404

## (undated) DEVICE — DRSG GAUZE 4X4" 8044

## (undated) DEVICE — SUCTION MANIFOLD NEPTUNE 2 SYS 1 PORT 702-025-000

## (undated) DEVICE — DRSG GAUZE 4X4" 3033

## (undated) DEVICE — SOL NACL 0.9% IRRIG 1000ML BOTTLE 2F7124

## (undated) DEVICE — CUFF TOURN 18IN STRL DISP

## (undated) DEVICE — DRAPE STOCKINETTE IMPERVIOUS 12" 1587

## (undated) DEVICE — BNDG KLING 1" 2230

## (undated) DEVICE — PLATE GROUNDING ADULT W/CORD 9165L

## (undated) DEVICE — BANDAGE ELASTIC 4X550 LF DBL 593-94LF

## (undated) DEVICE — GLOVE BIOGEL INDICATOR 7.5 LF 41675

## (undated) DEVICE — SPECIMEN CULTURETTE DBL SWAB 220109

## (undated) DEVICE — GLOVE BIOGEL PI SZ 7.5 40875

## (undated) DEVICE — GLOVE BIOGEL PI INDICATOR 9.0 LF  41690

## (undated) DEVICE — CUSTOM PACK LOWER EXTREMITY SOP5BLEHEA

## (undated) DEVICE — DRSG KERLIX FLUFFS X5

## (undated) DEVICE — KIT CULTURE TRANSPORT SYS A.C.T. II DUAL ANEROBE R124022

## (undated) DEVICE — ESU PENCIL SMOKE EVAC W/ROCKER SWITCH 0703-047-000

## (undated) DEVICE — DRSG ADAPTIC 3X3" 6112

## (undated) DEVICE — ALCOHOL ISOPROPYL 4 OZ 70% IA7004

## (undated) DEVICE — SU ETHILON 3-0 PS-2 18" 1669H

## (undated) DEVICE — DRSG COTTON ROLL DEROYAL STERILE 9866-01

## (undated) DEVICE — GOWN IMPERVIOUS BREATHABLE 2XL/XLONG

## (undated) DEVICE — TRAY PREP DRY SKIN SCRUB 067

## (undated) DEVICE — GLOVE SURG PI ULTRA TOUCH M SZ 8-1/2 LF

## (undated) RX ORDER — FENTANYL CITRATE 50 UG/ML
INJECTION, SOLUTION INTRAMUSCULAR; INTRAVENOUS
Status: DISPENSED
Start: 2022-06-28